# Patient Record
Sex: MALE | Race: BLACK OR AFRICAN AMERICAN
[De-identification: names, ages, dates, MRNs, and addresses within clinical notes are randomized per-mention and may not be internally consistent; named-entity substitution may affect disease eponyms.]

---

## 2018-07-17 ENCOUNTER — HOSPITAL ENCOUNTER (EMERGENCY)
Dept: HOSPITAL 92 - ERS | Age: 23
LOS: 1 days | Discharge: HOME | End: 2018-07-18
Payer: SELF-PAY

## 2018-07-17 DIAGNOSIS — Z03.89: Primary | ICD-10-CM

## 2018-07-17 PROCEDURE — 99283 EMERGENCY DEPT VISIT LOW MDM: CPT

## 2018-09-01 ENCOUNTER — HOSPITAL ENCOUNTER (EMERGENCY)
Dept: HOSPITAL 92 - ERS | Age: 23
Discharge: HOME | End: 2018-09-01
Payer: SELF-PAY

## 2018-09-01 DIAGNOSIS — S93.402A: Primary | ICD-10-CM
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
## 2018-09-01 NOTE — RAD
RADIOGRAPH LEFT ANKLE 3 VIEWS:

9/1/18

 

HISTORY: 

23-year-old male status post acute traumatic injury to the ankle.

 

FINDINGS:  

There is no fracture or dislocation. Talar dome is maintained. Ankle mortise is symmetrical. 

 

IMPRESSION:  

Negative. 

 

POS: [facility]

## 2020-06-24 ENCOUNTER — HOSPITAL ENCOUNTER (EMERGENCY)
Dept: HOSPITAL 92 - ERS | Age: 25
Discharge: HOME | End: 2020-06-24
Payer: SELF-PAY

## 2020-06-24 DIAGNOSIS — R07.9: Primary | ICD-10-CM

## 2020-06-24 DIAGNOSIS — R06.02: ICD-10-CM

## 2020-06-24 LAB
ALBUMIN SERPL BCG-MCNC: 5.1 G/DL (ref 3.5–5)
ALP SERPL-CCNC: 61 U/L (ref 40–110)
ALT SERPL W P-5'-P-CCNC: 15 U/L (ref 8–55)
ANION GAP SERPL CALC-SCNC: 17 MMOL/L (ref 10–20)
ANISOCYTOSIS BLD QL SMEAR: (no result) (100X)
AST SERPL-CCNC: 27 U/L (ref 5–34)
BILIRUB SERPL-MCNC: 1.1 MG/DL (ref 0.2–1.2)
BUN SERPL-MCNC: 9 MG/DL (ref 8.9–20.6)
CALCIUM SERPL-MCNC: 10.3 MG/DL (ref 7.8–10.44)
CHLORIDE SERPL-SCNC: 104 MMOL/L (ref 98–107)
CO2 SERPL-SCNC: 19 MMOL/L (ref 22–29)
CREAT CL PREDICTED SERPL C-G-VRATE: 0 ML/MIN (ref 70–130)
GLOBULIN SER CALC-MCNC: 3.2 G/DL (ref 2.4–3.5)
GLUCOSE SERPL-MCNC: 114 MG/DL (ref 70–105)
HGB BLD-MCNC: 12.5 G/DL (ref 14–18)
MCH RBC QN AUTO: 23.9 PG (ref 27–31)
MCV RBC AUTO: 72.2 FL (ref 78–98)
MDIFF COMPLETE?: YES
PLATELET # BLD AUTO: 233 THOU/UL (ref 130–400)
POTASSIUM SERPL-SCNC: 3.4 MMOL/L (ref 3.5–5.1)
RBC # BLD AUTO: 5.24 MILL/UL (ref 4.7–6.1)
SODIUM SERPL-SCNC: 137 MMOL/L (ref 136–145)
TARGETS BLD QL SMEAR: (no result) (100X)
WBC # BLD AUTO: 13.1 THOU/UL (ref 4.8–10.8)

## 2020-06-24 PROCEDURE — 80053 COMPREHEN METABOLIC PANEL: CPT

## 2020-06-24 PROCEDURE — 96374 THER/PROPH/DIAG INJ IV PUSH: CPT

## 2020-06-24 PROCEDURE — 84484 ASSAY OF TROPONIN QUANT: CPT

## 2020-06-24 PROCEDURE — 71045 X-RAY EXAM CHEST 1 VIEW: CPT

## 2020-06-24 PROCEDURE — 93005 ELECTROCARDIOGRAM TRACING: CPT

## 2020-06-24 PROCEDURE — 85025 COMPLETE CBC W/AUTO DIFF WBC: CPT

## 2020-06-24 NOTE — RAD
CHEST 1 VIEW:

 

INDICATION: 

History of shortness of breath and chest pain.

 

COMPARISON: 

None.

 

FINDINGS: 

Lungs are mildly hyperexpanded but clear.  Heart size is accentuated by the exam technique.  Portions
 of the left costophrenic angle are excluded.  No overt pneumothorax is evident.  No definite acute o
sseous abnormality is noted.

 

IMPRESSION: 

No definite acute cardiopulmonary abnormality.

 

POS: [facility]

## 2020-06-28 NOTE — EKG
Test Reason : 

Blood Pressure : ***/*** mmHG

Vent. Rate : 133 BPM     Atrial Rate : 133 BPM

   P-R Int : 154 ms          QRS Dur : 070 ms

    QT Int : 234 ms       P-R-T Axes : 088 078 045 degrees

   QTc Int : 348 ms

 

Sinus tachycardia

Possible Inferior infarct , age undetermined

Abnormal ECG

 

Confirmed by GIN GARAY ([id number]),  ARNIE THOMPSON ([id number]) on 6/28/2020 10:17:38 AM

 

Referred By:             Confirmed By:GIN GARAY